# Patient Record
Sex: MALE | Race: WHITE
[De-identification: names, ages, dates, MRNs, and addresses within clinical notes are randomized per-mention and may not be internally consistent; named-entity substitution may affect disease eponyms.]

---

## 2019-08-12 ENCOUNTER — HOSPITAL ENCOUNTER (EMERGENCY)
Dept: HOSPITAL 26 - MED | Age: 19
Discharge: HOME | End: 2019-08-12
Payer: COMMERCIAL

## 2019-08-12 VITALS — SYSTOLIC BLOOD PRESSURE: 155 MMHG | DIASTOLIC BLOOD PRESSURE: 84 MMHG

## 2019-08-12 VITALS — DIASTOLIC BLOOD PRESSURE: 108 MMHG | SYSTOLIC BLOOD PRESSURE: 140 MMHG

## 2019-08-12 VITALS — BODY MASS INDEX: 22.9 KG/M2 | WEIGHT: 160 LBS | HEIGHT: 70 IN

## 2019-08-12 DIAGNOSIS — F90.9: ICD-10-CM

## 2019-08-12 DIAGNOSIS — Z79.899: ICD-10-CM

## 2019-08-12 DIAGNOSIS — F15.10: Primary | ICD-10-CM

## 2019-08-12 LAB
ALBUMIN FLD-MCNC: 5.1 G/DL (ref 3.4–5)
AMORPH SED URNS QL MICRO: (no result) /HPF
ANION GAP SERPL CALCULATED.3IONS-SCNC: 16.2 MMOL/L (ref 8–16)
APAP SERPL-MCNC: < 0.5 UG/ML (ref 10–30)
APPEARANCE UR: (no result)
AST SERPL-CCNC: 74 U/L (ref 15–37)
BARBITURATES UR QL SCN: (no result) NG/ML
BASOPHILS # BLD AUTO: 0 K/UL (ref 0–0.22)
BASOPHILS NFR BLD AUTO: 0.3 % (ref 0–2)
BENZODIAZ UR QL SCN: (no result) NG/ML
BILIRUB SERPL-MCNC: 1.5 MG/DL (ref 0–1)
BILIRUB UR QL STRIP: (no result)
BUN SERPL-MCNC: 19 MG/DL (ref 7–18)
BZE UR QL SCN: (no result) NG/ML
CANNABINOIDS UR QL SCN: (no result) NG/ML
CHLORIDE SERPL-SCNC: 97 MMOL/L (ref 98–107)
CO2 SERPL-SCNC: 25 MMOL/L (ref 21–32)
COLOR UR: YELLOW
CREAT SERPL-MCNC: 1.4 MG/DL (ref 0.7–1.3)
EOSINOPHIL # BLD AUTO: 0 K/UL (ref 0–0.4)
EOSINOPHIL NFR BLD AUTO: 0.1 % (ref 0–4)
ERYTHROCYTE [DISTWIDTH] IN BLOOD BY AUTOMATED COUNT: 14.3 % (ref 11.6–13.7)
GFR SERPL CREATININE-BSD FRML MDRD: 84 ML/MIN (ref 90–?)
GLUCOSE SERPL-MCNC: 111 MG/DL (ref 74–106)
GLUCOSE UR STRIP-MCNC: NEGATIVE MG/DL
HCT VFR BLD AUTO: 46.2 % (ref 36–52)
HGB BLD-MCNC: 15.5 G/DL (ref 12–18)
HGB UR QL STRIP: (no result)
HYALINE CASTS URNS QL MICRO: (no result) /LPF
LEUKOCYTE ESTERASE UR QL STRIP: NEGATIVE
LYMPHOCYTES # BLD AUTO: 1.4 K/UL (ref 2–11.5)
LYMPHOCYTES NFR BLD AUTO: 14.8 % (ref 20.5–51.1)
MCH RBC QN AUTO: 30 PG (ref 27–31)
MCHC RBC AUTO-ENTMCNC: 34 G/DL (ref 33–37)
MCV RBC AUTO: 88.9 FL (ref 80–94)
MONOCYTES # BLD AUTO: 1.2 K/UL (ref 0.8–1)
MONOCYTES NFR BLD AUTO: 13.4 % (ref 1.7–9.3)
NEUTROPHILS # BLD AUTO: 6.6 K/UL (ref 1.8–7.7)
NEUTROPHILS NFR BLD AUTO: 71.4 % (ref 42.2–75.2)
NITRITE UR QL STRIP: NEGATIVE
OPIATES UR QL SCN: (no result) NG/ML
PCP UR QL SCN: (no result) NG/ML
PH UR STRIP: 6 [PH] (ref 5–9)
PLATELET # BLD AUTO: 127 K/UL (ref 140–450)
POTASSIUM SERPL-SCNC: 3.2 MMOL/L (ref 3.5–5.1)
RBC # BLD AUTO: 5.19 MIL/UL (ref 4.2–6.1)
RBC #/AREA URNS HPF: (no result) /HPF (ref 0–5)
SALICYLATES SERPL-MCNC: < 2.8 MG/DL (ref 2.8–20)
SODIUM SERPL-SCNC: 135 MMOL/L (ref 136–145)
WBC # BLD AUTO: 9.2 K/UL (ref 4.5–11)
WBC,URINE: (no result) /HPF (ref 0–5)

## 2019-08-12 PROCEDURE — 80305 DRUG TEST PRSMV DIR OPT OBS: CPT

## 2019-08-12 PROCEDURE — 85025 COMPLETE CBC W/AUTO DIFF WBC: CPT

## 2019-08-12 PROCEDURE — 87086 URINE CULTURE/COLONY COUNT: CPT

## 2019-08-12 PROCEDURE — 80053 COMPREHEN METABOLIC PANEL: CPT

## 2019-08-12 PROCEDURE — G0480 DRUG TEST DEF 1-7 CLASSES: HCPCS

## 2019-08-12 PROCEDURE — G0482 DRUG TEST DEF 15-21 CLASSES: HCPCS

## 2019-08-12 PROCEDURE — 99285 EMERGENCY DEPT VISIT HI MDM: CPT

## 2019-08-12 PROCEDURE — 81001 URINALYSIS AUTO W/SCOPE: CPT

## 2019-08-12 PROCEDURE — 36415 COLL VENOUS BLD VENIPUNCTURE: CPT

## 2019-08-12 NOTE — NUR
SISTER BEDSIDE. PT AA0X4. 1-1 MONITORING IN PLACE BY HAO MEDINA.

-------------------------------------------------------------------------------

Addendum: 08/12/19 at 1440 by DARRION

-------------------------------------------------------------------------------

BY SUZETTE MEDINA

## 2019-08-12 NOTE — NUR
PT TACHY . OTHER VITALS STABLE AT THIS TIME. SISTER BEDSIDE. REGULAR DIET 
DELIVERED. 1-1 MONITORING IN PLACE BY HAO MEDINA.

## 2019-08-12 NOTE — NUR
ASKED PT THE MEDICATION HE IS TAKING, PT HANDED GREEN CAPSULE LABELED NVR D15. 
WHEN ASKED PT LAST TAKEN, PT RESPONDED "YES"

## 2019-08-12 NOTE — NUR
PT AA0X4. CALM AND COOPERATIVE. RR EVEN AND UNLABORED. SISTER BEDSIDE. 1-1 
MONITORING BY SUZETTE EMT.

## 2019-08-12 NOTE — NUR
[facility] contacted [facility] requesting documentation of patient medical clearance 
for psych placement. Once received, [facility] will refer chart to contracted 
facilities.

## 2019-08-12 NOTE — NUR
Patient discharged with v/s stable. Written and verbal after care instructions 
given and explained. 

Patient verbalized understanding. Ambulatory with steady gait. All questions 
addressed prior to discharge. Advised to follow up with PMD. PT GIVEN SUBSTANCE 
ABUSE RESOURCE PACKET

## 2019-08-12 NOTE — NUR
Supervisor Criss called earlier about patient asking for PMD to write a note 
if patient is medically cleared due to elevated heart rate.  Once that is 
received we can continue to facilitate placement for continuity of care.  Thank 
you

## 2019-08-12 NOTE — NUR
18 Y MALE PT BIBA FOR 5150 HOLD. PER EMS PT WAS ON SECOND STORY THREATENING TO 
JUMP, POLICE APREHENDED PT AND PUT HIM ON 5150 HOLD DUE TO DANGER TO SELF. PT 
TOOK ADHD MEDICINE AND MOTRIN. PT DENIES SUICIDAL IDEATION AT THIS TIME. +METH 
USE YESTERDAY, RELEASED FROM [facility] YESTERDAY. PT ARRIVES IN 4 POINT RESTRAINTS 
DUE TO DANGER TO OTHERS. AT THIS TIME, PT IS COOPERATIVE AND NOT IN RESTRAINTS. 
PT TACHY . AA0X4. SUICIDAL PRECAUTIONS IN PLACE. POTENTIALLY HARMFUL 
OBJECTS REMOVED FROM PTS ROOM. 

1-1 MONITORING IN PLACE.



MEDHX:PHYCH, ADHD

RX:[id number]

## 2021-04-01 ENCOUNTER — HOSPITAL ENCOUNTER (EMERGENCY)
Dept: HOSPITAL 26 - MED | Age: 21
LOS: 1 days | Discharge: HOME | End: 2021-04-02
Payer: COMMERCIAL

## 2021-04-01 VITALS — DIASTOLIC BLOOD PRESSURE: 87 MMHG | SYSTOLIC BLOOD PRESSURE: 140 MMHG

## 2021-04-01 VITALS — BODY MASS INDEX: 22.4 KG/M2 | HEIGHT: 71 IN | WEIGHT: 160 LBS

## 2021-04-01 DIAGNOSIS — H53.143: ICD-10-CM

## 2021-04-01 DIAGNOSIS — Z79.899: ICD-10-CM

## 2021-04-01 DIAGNOSIS — T50.7X1A: Primary | ICD-10-CM
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
## 2021-04-01 PROCEDURE — 99284 EMERGENCY DEPT VISIT MOD MDM: CPT

## 2021-04-01 PROCEDURE — 80305 DRUG TEST PRSMV DIR OPT OBS: CPT

## 2021-04-01 NOTE — NUR
PATIENT 19 Y/O MALE BIBA FOR C/O ACCIDENTAL OVERDOSE. PER EMS PATIENT HAD 
OVERDOSE OF FENTYNL. PER EMS PATIENT RECIVED 6 MG OF NARCAN IN. PATIENT 
RESPIRATIONS ARE EVEN AND UNLABORED SKIN IS WARM AND DRY TO TOUCH PATIENT ALERT 
AND ORIENTED X 2- NAME AND PLACE. PATIENT ARRIVED WITH 18G IV IN L AC. PATIENT 
ADMITS TO NAUSEA. PATIENT DENIES STOMACH PAIN AT THIS TIME. "I DON'T REMEMBER 
WHAT HAPPENED I JUST TOOK SOME PILLS." 



MEDHX: DENIES 

ALLERGIES: DENIES.

## 2021-04-02 VITALS — DIASTOLIC BLOOD PRESSURE: 72 MMHG | SYSTOLIC BLOOD PRESSURE: 130 MMHG

## 2021-04-02 LAB
BARBITURATES UR QL SCN: NEGATIVE NG/ML
BENZODIAZ UR QL SCN: NEGATIVE NG/ML
BZE UR QL SCN: NEGATIVE NG/ML
CANNABINOIDS UR QL SCN: POSITIVE NG/ML
OPIATES UR QL SCN: NEGATIVE NG/ML
PCP UR QL SCN: NEGATIVE NG/ML

## 2021-04-02 NOTE — NUR
PATIENT AMBUALTED TO RESTROOM WITH STEADY GAIT. PATIENT STATES,"WHEN CAN I GO 
HOME I FEEL FINE NOW. I JUST WANT TO GO HOME."

## 2021-04-02 NOTE — NUR
Patient presented to facility under the influence of Drugs.  Patient is 
currently ambulatory with steady gait, able to walk unassisted.  Positive gag 
reflex.  Alert and oriented.  Is not driving self for discharge out of 
facility.

## 2021-04-02 NOTE — NUR
Patient discharged with v/s stable. Written and verbal after care instructions 
given and explained. 

Patient alert, oriented and verbalized understanding of instructions. 
Ambulatory with steady gait. All questions addressed prior to discharge. ID 
band removed. Patient advised to follow up with PMD. Rx of NARCAN given. 
Patient educated on indication of medication including possible reaction and 
side effects. Opportunity to ask questions provided and answered.

## 2021-09-24 ENCOUNTER — HOSPITAL ENCOUNTER (EMERGENCY)
Dept: HOSPITAL 26 - MED | Age: 21
Discharge: LEFT BEFORE DISCHARGE | End: 2021-09-24
Payer: MEDICAID

## 2021-09-24 VITALS — HEIGHT: 71 IN | BODY MASS INDEX: 22.4 KG/M2 | WEIGHT: 160 LBS

## 2021-09-24 VITALS — DIASTOLIC BLOOD PRESSURE: 74 MMHG | SYSTOLIC BLOOD PRESSURE: 114 MMHG
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
## 2021-09-24 DIAGNOSIS — T40.415A: Primary | ICD-10-CM

## 2021-09-24 DIAGNOSIS — Z53.21: ICD-10-CM

## 2021-09-24 NOTE — NUR
-------------------------------------------------------------------------------

           *** Note undone in EDM - 09/24/21 at 1208 by [email or web address] ***            

-------------------------------------------------------------------------------

PATIENT ELOPED FROM FACILITY. DISCHARGE INSTRUCTIONS NOT GIVEN TO PATIENT. DR. STOUT NOTIFIED.

## 2021-09-24 NOTE — NUR
-------------------------------------------------------------------------------

            *** Note undone in ED - [facility] ***            

-------------------------------------------------------------------------------

PATIENT ELOPED FROM FACILITY. DISCHARGE INSTRUCTIONS NOT GIVEN TO PATIENT. CANDY LEUNG NOTIFIED.

## 2021-09-24 NOTE — NUR
-------------------------------------------------------------------------------

           *** Note undone in ED - 09/24/21 at 1210 by [email or web address] ***            

-------------------------------------------------------------------------------

PATIENT LEFT WITHOUT BEING SEEN BY DR. STOUT. NO FURTHER CARE PROVIDED FOR 
PATIENT.